# Patient Record
Sex: MALE | Race: WHITE | URBAN - METROPOLITAN AREA
[De-identification: names, ages, dates, MRNs, and addresses within clinical notes are randomized per-mention and may not be internally consistent; named-entity substitution may affect disease eponyms.]

---

## 2017-06-02 ENCOUNTER — IMPORTED ENCOUNTER (OUTPATIENT)
Dept: URBAN - METROPOLITAN AREA CLINIC 50 | Facility: CLINIC | Age: 72
End: 2017-06-02

## 2017-06-05 ENCOUNTER — IMPORTED ENCOUNTER (OUTPATIENT)
Dept: URBAN - METROPOLITAN AREA CLINIC 50 | Facility: CLINIC | Age: 72
End: 2017-06-05

## 2017-06-05 NOTE — PATIENT DISCUSSION
"""Annual Type 1 Diabetic eye exam with dilation. Mild diabetic retinopathy found. Macular edema is not present in the leftt eye. Recommend annual dilated examinations. Patient instructed to call office immediately if sudden changes in vision occur. Emphasized importance of good blood glucose control. Summary of care provided to the physician managing the ongoing diabetes care. """

## 2018-08-02 ENCOUNTER — IMPORTED ENCOUNTER (OUTPATIENT)
Dept: URBAN - METROPOLITAN AREA CLINIC 50 | Facility: CLINIC | Age: 73
End: 2018-08-02

## 2019-10-10 ENCOUNTER — IMPORTED ENCOUNTER (OUTPATIENT)
Dept: URBAN - METROPOLITAN AREA CLINIC 50 | Facility: CLINIC | Age: 74
End: 2019-10-10

## 2020-05-08 ENCOUNTER — IMPORTED ENCOUNTER (OUTPATIENT)
Dept: URBAN - METROPOLITAN AREA CLINIC 50 | Facility: CLINIC | Age: 75
End: 2020-05-08

## 2020-07-06 NOTE — PATIENT DISCUSSION
The patient was informed that with [employer] [facility] for near, they will need glasses for all intermediate and distance activities after surgery. The patient understands there is a possibility they may need an enhancement after surgery. The patient elects [employer] OD, goal of -1.50/-1.75.

## 2020-07-14 NOTE — PATIENT DISCUSSION
Cataract surgery not recommended at this time due to possible complications and limited vision improvement;  history of old trauma to OS, 40 years ago.

## 2020-07-14 NOTE — PATIENT DISCUSSION
Patient advised of the right to post-operative care by the surgeon. Patient is fully informed of, and agreed to, co-management with their primary optometric physician. Post-operative care by the surgeon is not medically necessary and co-management is clinically appropriate. Patient has received itemization of fees related to cataract surgery. Transfer of care letter completed for the patient. Transfer care of right eye to Dr. Tino Tamez on 7/14/2020. Patient instructed to call immediately if any new distortion, blurring, decreased vision or eye pain.

## 2020-07-30 ENCOUNTER — IMPORTED ENCOUNTER (OUTPATIENT)
Dept: URBAN - METROPOLITAN AREA CLINIC 50 | Facility: CLINIC | Age: 75
End: 2020-07-30

## 2020-10-04 NOTE — PATIENT DISCUSSION
Artificial Tears: One drop to both eyes 3-4 times daily. We recommend Systane or Refresh lubricating eye drops which can be found at any pharmacy. Ambulatory

## 2021-04-18 ASSESSMENT — VISUAL ACUITY
OS_OTHER: 20/80. 20/200.
OS_CC: J1
OD_CC: J1+
OS_CC: 20/30
OS_BAT: 20/70
OD_OTHER: 20/60. 20/80.
OS_CC: J1+
OD_BAT: 20/60
OD_CC: J1+@ 16 IN
OD_PH: 20/30-
OD_CC: 20/50-2
OD_CC: 20/40+
OD_CC: J1
OS_BAT: 20/80
OD_CC: 20/30
OS_OTHER: 20/70. 20/100.
OS_CC: 20/30-2
OS_CC: J1+@ 16 IN
OD_OTHER: 20/60. 20/80.
OS_CC: 20/30-
OD_BAT: 20/60

## 2021-04-18 ASSESSMENT — TONOMETRY
OD_IOP_MMHG: 15
OD_IOP_MMHG: 14
OS_IOP_MMHG: 16
OS_IOP_MMHG: 13
OS_IOP_MMHG: 14
OD_IOP_MMHG: 14

## 2021-08-06 ENCOUNTER — ANNUAL COMPREHENSIVE EXAM (OUTPATIENT)
Dept: URBAN - METROPOLITAN AREA CLINIC 52 | Facility: CLINIC | Age: 76
End: 2021-08-06

## 2021-08-06 DIAGNOSIS — H25.13: ICD-10-CM

## 2021-08-06 DIAGNOSIS — H35.372: ICD-10-CM

## 2021-08-06 DIAGNOSIS — H52.12: ICD-10-CM

## 2021-08-06 DIAGNOSIS — Z79.4: ICD-10-CM

## 2021-08-06 DIAGNOSIS — E11.9: ICD-10-CM

## 2021-08-06 DIAGNOSIS — H35.52: ICD-10-CM

## 2021-08-06 DIAGNOSIS — H04.123: ICD-10-CM

## 2021-08-06 DIAGNOSIS — H43.813: ICD-10-CM

## 2021-08-06 DIAGNOSIS — H02.831: ICD-10-CM

## 2021-08-06 DIAGNOSIS — H02.834: ICD-10-CM

## 2021-08-06 PROCEDURE — 92014 COMPRE OPH EXAM EST PT 1/>: CPT

## 2021-08-06 PROCEDURE — 92015 DETERMINE REFRACTIVE STATE: CPT

## 2021-08-06 PROCEDURE — 92134 CPTRZ OPH DX IMG PST SGM RTA: CPT

## 2021-08-06 ASSESSMENT — VISUAL ACUITY
OS_GLARE: 20/40
OS_PH: 20/30
OS_GLARE: 20/50
OD_PH: 20/30
OD_CC: 20/40+2
OD_GLARE: 20/60
OS_CC: 20/40+2
OD_GLARE: 20/40

## 2021-08-06 ASSESSMENT — TONOMETRY
OS_IOP_MMHG: 14
OD_IOP_MMHG: 14

## 2022-03-31 ENCOUNTER — ESTABLISHED PATIENT (OUTPATIENT)
Dept: URBAN - METROPOLITAN AREA CLINIC 50 | Facility: CLINIC | Age: 77
End: 2022-03-31

## 2022-03-31 DIAGNOSIS — H04.322: ICD-10-CM

## 2022-03-31 PROCEDURE — 92012 INTRM OPH EXAM EST PATIENT: CPT

## 2022-03-31 RX ORDER — TOBRAMYCIN AND DEXAMETHASONE 1; 3 MG/ML; MG/ML
1 SUSPENSION/ DROPS OPHTHALMIC
Start: 2022-03-31

## 2022-03-31 ASSESSMENT — TONOMETRY
OS_IOP_MMHG: 19
OD_IOP_MMHG: 20

## 2022-03-31 ASSESSMENT — VISUAL ACUITY
OD_CC: 20/60
OS_CC: 20/50

## 2022-03-31 NOTE — PATIENT DISCUSSION
Will start patient on Cephalexin 500mg 1 tablet by mouth twice a day for 10 days. Will start patient on Tobradex 1gtt QID OS for 10 days. Erx prescriptions to [facility]. Will see patient back in one week.

## 2023-01-06 ENCOUNTER — COMPREHENSIVE EXAM (OUTPATIENT)
Dept: URBAN - METROPOLITAN AREA CLINIC 52 | Facility: CLINIC | Age: 78
End: 2023-01-06

## 2023-01-06 DIAGNOSIS — H52.12: ICD-10-CM

## 2023-01-06 DIAGNOSIS — E11.9: ICD-10-CM

## 2023-01-06 DIAGNOSIS — H25.13: ICD-10-CM

## 2023-01-06 DIAGNOSIS — H43.813: ICD-10-CM

## 2023-01-06 DIAGNOSIS — H35.52: ICD-10-CM

## 2023-01-06 DIAGNOSIS — H04.552: ICD-10-CM

## 2023-01-06 DIAGNOSIS — Z79.4: ICD-10-CM

## 2023-01-06 PROCEDURE — 92015 DETERMINE REFRACTIVE STATE: CPT

## 2023-01-06 PROCEDURE — 92014 COMPRE OPH EXAM EST PT 1/>: CPT

## 2023-01-06 ASSESSMENT — VISUAL ACUITY
OD_GLARE: 20/60
OD_CC: 20/40
OU_CC: 20/40
OS_PH: 20/50
OS_CC: 20/60-1
OS_GLARE: 20/80
OU_CC: J3

## 2023-01-06 ASSESSMENT — TONOMETRY
OS_IOP_MMHG: 15
OD_IOP_MMHG: 14

## 2024-01-19 ENCOUNTER — COMPREHENSIVE EXAM (OUTPATIENT)
Dept: URBAN - METROPOLITAN AREA CLINIC 52 | Facility: CLINIC | Age: 79
End: 2024-01-19

## 2024-01-19 DIAGNOSIS — H43.813: ICD-10-CM

## 2024-01-19 DIAGNOSIS — H35.372: ICD-10-CM

## 2024-01-19 DIAGNOSIS — H25.13: ICD-10-CM

## 2024-01-19 DIAGNOSIS — Z79.4: ICD-10-CM

## 2024-01-19 DIAGNOSIS — H35.52: ICD-10-CM

## 2024-01-19 DIAGNOSIS — E11.9: ICD-10-CM

## 2024-01-19 DIAGNOSIS — H02.831: ICD-10-CM

## 2024-01-19 DIAGNOSIS — H02.834: ICD-10-CM

## 2024-01-19 DIAGNOSIS — H04.123: ICD-10-CM

## 2024-01-19 PROCEDURE — 99213 OFFICE O/P EST LOW 20 MIN: CPT

## 2024-01-19 PROCEDURE — 92015 DETERMINE REFRACTIVE STATE: CPT

## 2024-01-19 PROCEDURE — 92134 CPTRZ OPH DX IMG PST SGM RTA: CPT

## 2024-01-19 ASSESSMENT — TONOMETRY
OS_IOP_MMHG: 15
OD_IOP_MMHG: 16

## 2024-01-19 ASSESSMENT — VISUAL ACUITY
OD_GLARE: 20/30-1
OD_GLARE: 20/40-1
OD_PH: 20/30-1
OS_GLARE: 20/50-1
OS_GLARE: 20/100
OU_CC: J1@16"
OS_CC: 20/40+1
OD_CC: 20/50-1